# Patient Record
Sex: FEMALE | HISPANIC OR LATINO | URBAN - METROPOLITAN AREA
[De-identification: names, ages, dates, MRNs, and addresses within clinical notes are randomized per-mention and may not be internally consistent; named-entity substitution may affect disease eponyms.]

---

## 2022-12-08 ENCOUNTER — OFFICE VISIT (OUTPATIENT)
Dept: URBAN - METROPOLITAN AREA CLINIC 44 | Facility: CLINIC | Age: 62
End: 2022-12-08
Payer: COMMERCIAL

## 2022-12-08 DIAGNOSIS — H11.041 PERIPHERAL PTERYGIUM, STATIONARY, RIGHT EYE: ICD-10-CM

## 2022-12-08 DIAGNOSIS — E11.9 TYPE 2 DIABETES MELLITUS W/O COMPLICATION: ICD-10-CM

## 2022-12-08 DIAGNOSIS — H16.223 KERATOCONJUNCTIVITIS SICCA, BILATERAL: ICD-10-CM

## 2022-12-08 DIAGNOSIS — H40.033 ANATOMICAL NARROW ANGLE, BILATERAL: ICD-10-CM

## 2022-12-08 DIAGNOSIS — H40.013 OPEN ANGLE WITH BORDERLINE FINDINGS, LOW RISK, BILATERAL: Primary | ICD-10-CM

## 2022-12-08 DIAGNOSIS — Z79.84 LONG TERM (CURRENT) USE OF ORAL HYPOGLYCEMIC DRUGS: ICD-10-CM

## 2022-12-08 DIAGNOSIS — H25.13 AGE-RELATED NUCLEAR CATARACT, BILATERAL: ICD-10-CM

## 2022-12-08 PROCEDURE — 92020 GONIOSCOPY: CPT

## 2022-12-08 PROCEDURE — 92133 CPTRZD OPH DX IMG PST SGM ON: CPT

## 2022-12-08 PROCEDURE — 92004 COMPRE OPH EXAM NEW PT 1/>: CPT

## 2022-12-08 ASSESSMENT — KERATOMETRY
OD: 39.50
OS: 40.13

## 2022-12-08 ASSESSMENT — INTRAOCULAR PRESSURE
OD: 17
OS: 16
OS: 17
OD: 16

## 2022-12-08 ASSESSMENT — VISUAL ACUITY
OS: 20/30
OD: 20/25

## 2022-12-08 NOTE — IMPRESSION/PLAN
Impression: Anatomical narrow angle, bilateral: H40.033.  Plan: Occudable OD>OS 
RTC for LPI Consult

## 2022-12-08 NOTE — IMPRESSION/PLAN
Impression: Peripheral pterygium, stationary, right eye: H11.041. Plan: Ed pt on clinical findings. DWP that condition is stable and does not require intervention at this time. Recommend UV protection when outdoors and ATs PRN for comfort. RTC if condition worsens.

## 2022-12-08 NOTE — IMPRESSION/PLAN
Impression: Age-related nuclear cataract, bilateral: H25.13. Plan: Patient advised there is a cataract, but that visual function is good, and cataract surgery is not required at this time. Further advised there is no specific urgency for cataract surgery. They were also advised that having cataract surgery does not mean they will not need to use glasses or contact lenses.  RTC after LPI OU

## 2022-12-08 NOTE — IMPRESSION/PLAN
Impression: Keratoconjunctivitis sicca, bilateral: U68.297. Plan: Dry eyes account for the patient's complaints. There is no evidence of permanent changes to the cornea. Explained condition does not have a cure and will need artificial tears for maintenance. Patient instructed to use artificial tears 4-6x/daily. Explained it may take time for eyes to acclimate completely to OTC gtt regimen.

## 2022-12-08 NOTE — IMPRESSION/PLAN
Impression: Type 2 diabetes mellitus w/o complication: K97.4. Plan: Ed pt on clinical findings. DWP that there is no retinopathy or macular edema present on exam today. Reminded pt that ADA recommends target A1c of 7.0 or less to minimize risk of retinopathy as well as other systemic complications of DM. Advised pt to RTC if vision changes.

## 2022-12-08 NOTE — IMPRESSION/PLAN
Impression: Open angle with borderline findings, low risk, bilateral: H40.013. Plan: IOP: 17/17
cupping OD>OS
RNFL 12/8/2022: WNL OU Monitor without gtts.

## 2023-03-24 ENCOUNTER — OFFICE VISIT (OUTPATIENT)
Dept: URBAN - METROPOLITAN AREA CLINIC 44 | Facility: CLINIC | Age: 63
End: 2023-03-24
Payer: COMMERCIAL

## 2023-03-24 DIAGNOSIS — H40.033 ANATOMICAL NARROW ANGLE, BILATERAL: Primary | ICD-10-CM

## 2023-03-24 DIAGNOSIS — H04.123 DRY EYE SYNDROME OF BILATERAL LACRIMAL GLANDS: ICD-10-CM

## 2023-03-24 DIAGNOSIS — H25.13 AGE-RELATED NUCLEAR CATARACT, BILATERAL: ICD-10-CM

## 2023-03-24 PROCEDURE — 92004 COMPRE OPH EXAM NEW PT 1/>: CPT | Performed by: OPHTHALMOLOGY

## 2023-03-24 PROCEDURE — 76514 ECHO EXAM OF EYE THICKNESS: CPT | Performed by: OPHTHALMOLOGY

## 2023-03-24 PROCEDURE — 92020 GONIOSCOPY: CPT | Performed by: OPHTHALMOLOGY

## 2023-03-24 RX ORDER — PREDNISOLONE ACETATE 10 MG/ML
1 % SUSPENSION/ DROPS OPHTHALMIC
Qty: 5 | Refills: 1 | Status: ACTIVE
Start: 2023-03-24

## 2023-03-24 ASSESSMENT — INTRAOCULAR PRESSURE
OD: 17
OS: 15

## 2023-03-24 NOTE — IMPRESSION/PLAN
Impression: Dry eye syndrome of bilateral lacrimal glands: H04.123. Plan: Dry eyes account for the patient's complaints. There is no evidence of permanent changes to the cornea. Explained condition does not have a cure and will need artificial tears for maintenance. 

Patient currently using tears QD, recommend increasing to 3-4 x daily OU

## 2023-03-24 NOTE — IMPRESSION/PLAN
Impression: Anatomical narrow angle, bilateral: H40.033. Plan: Pt has NAG  Risk  Gonio : RENU   Pachs:498/502   Today's IOP  :17/15    Tmax : 17/17 Pt denies Family hx of Glaucoma Vision equal OU   
C/D:0.5x0.5/0.55x0.55
OCT: 93/100 12/8/22 Pt denies Sulfa Allergy // Pt denies Lung /Heart dx Plan :
1. Recommend LPI; Discussed Risks and benefits of LPI. The patient was warned of signs and symptoms of angle closure glaucoma and the need for immediate follow-up. Discussed risks/benefits of laser peripheral iridotomy treatment. There is a possibility of need for additional sessions to complete LPI Discussed LPI does not lower pressure nor does it improve vision. If patient is on eye pressure reducing medication now, patient will still need to use them after LPI. A Ghost image or line in field of vision may be more evident in the bright light conditions. This usually resolves overtime. Also discussed possible need for further tx. Will rx Pred TID for 7 days then discontinue. 2. Pt agrees with plan and wishes to move forward 3. Schedule LPI OD then OS Risk Level 1